# Patient Record
(demographics unavailable — no encounter records)

---

## 2020-01-16 NOTE — PCM.HP.2
H&P History of Present Illness





- General


Date of Service: 01/16/20


Admit Problem/Dx: 


 Admission Diagnosis/Problem





Admission Diagnosis/Problem      Hypoxemia, Respiratory distress, RSV infection

, Moderate persistent asthma with acute exacerbation, Pneumonia, Dehydration, 

Otitis media








Source of Information: Patient, Family


History Limitations: Reports: No Limitations





- History of Present Illness


Initial Comments - Free Text/Narative: 


CC:SOB and wheezing





HPI: Thayer B Matthieu is a 4 yr 5 mo malewith moderate persistent asthmawho 

presents today forcheck up of SOB and wheezing. This has been associated with 

fever, URI symptoms and decreased PO intake. He has urinated only twice in last 

24 hours. He was seen in [facility] and was started on albuterol nebulization and 

prednisolone. He was also positive for RSV. Since he was not improving but 

rather getting worse and still spiking fevers mom got concerned and brought him 

in to get him checked out. He has been exposed to sick contacts. No Flu 

vaccine. He does go to .There is no h/o rash, vomiting, chest or 

abdominal pain, changes in bowel habits, or recent travel h/o. Patient PO 

intake is decreasedwith decreasedurine output.





Clinic Course: Patient was noted to be febrile, tachycardic and hypoxemic. PE 

pertinent for nasal congestion. B/L TM erythematous. Diffuse wheezing with 

retractions and crackles noted. CXR was done and showed peribronchial cuffing 

and perihilar opacities b/l. DDX include: Bronchiolitis, Pneumonia, Asthma 

exacerbation. Duoneb and reassess. On reassessment: Patient still hypoxemic and 

minimal improvement hence patient was admitted to hospital for further 

management.








- Related Data


Allergies/Adverse Reactions: 


 Allergies











Allergy/AdvReac Type Severity Reaction Status Date / Time


 


mushroom Allergy  Rash Verified 01/16/20 18:51











Home Medications: 


 Home Meds





Albuterol [Proventil Neb Soln] 1 ampule INH QID PRN 01/16/20 [History]


Montelukast [Singulair] 4 mg PO DAILY 01/16/20 [History]


Multivitamin Gummy 1 tab PO DAILY 01/16/20 [History]











Past Medical History


Respiratory History: Reports: Asthma, Bronchitis, Recurrent, Pneumonia, 

Recurrent


Genitourinary History: Reports: None


Psychiatric History: Reports: Other (See Below)


Other Psychiatric History: mom states patient might "be on the spectrum" and 

speech delay





- Infectious Disease History


Infectious Disease History: Reports: Other (See Below)


Other Infectious Disease History: current RSV diagnosis





- Past Surgical History


Respiratory Surgical History: Reports: None


Male  Surgical History: Reports: Circumcision





Social & Family History





- Family History


Respiratory: Reports: Asthma (both parents)


Musculoskeletal: Reports: Arthritis (paternal GF)


Endocrine/Metabolic: Reports: Diabetes, type II (Maternal GF)





- Tobacco Use


Smoking Status *Q: Never Smoker


Second Hand Smoke Exposure: No





- Caffeine Use


Caffeine Use: Reports: None





- Living Situation & Occupation


Living situation: Reports: with Family (Lives with Mom. Dad lives seperately. 

Goes to . Also goes to [employer]. Pets: Cat and dog)





H&P Review of Systems





- Review of Systems:


Review Of Systems: See Below


General: Reports: Fever, Weakness, Decreased Appetite


HEENT: Reports: Rhinitis, Post Nasal Drip


Pulmonary: Reports: Shortness of Breath, Wheezing, Cough


Cardiovascular: Reports: No Symptoms


Gastrointestinal: Reports: Decreased Appetite


Genitourinary: Reports: Other (decreased urination)


Musculoskeletal: Reports: No Symptoms


Skin: Reports: Dryness


Psychiatric: Reports: No Symptoms


Neurological: Reports: No Symptoms


Hematologic/Lymphatic: Reports: No Symptoms


Immunologic: Reports: No Symptoms





Exam





- Exam


Exam: See Below





- Vital Signs


Vital Signs: 


 Last Vital Signs











Temp  38.0 C   01/16/20 16:12


 


Pulse  140 H  01/16/20 16:12


 


Resp  50 H  01/16/20 16:12


 


BP  113/54   01/16/20 16:12


 


Pulse Ox  97   01/16/20 17:43











Weight: 17.463 kg





- Exam


Quality Assessment: Supplemental Oxygen


General: Alert, Oriented, Moderate Distress


HEENT: Conjunctiva Clear, EACs Clear, EOMI, Hearing Intact, Rhinitis, Other (B/

L TM erythematous), PERRLA


Neck: Supple, Trachea Midline, 2


Lungs: Decreased Breath Sounds, Crackles, Wheezing, Other (retractions)


Cardiovascular: Regular Rhythm, Tachycardia


GI/Abdominal Exam: Normal Bowel Sounds, Soft, Non-Tender, No Organomegaly, No 

Distention


 (Male) Exam: Normal Inspection


Rectal (Males) Exam: Normal Exam


Back Exam: Normal Inspection, Full Range of Motion, NT


Extremities: Normal Inspection, Normal Range of Motion, Non-Tender, No Pedal 

Edema, Slow Capillary Refill


Skin: Warm, Dry, Intact


Neurological: Reflexes Equal Bilateral


Neuro Extensive - Mental Status: Alert, Oriented x3, Normal Mood/Affect, Normal 

Cognition


Neuro Extensive - Motor, Sensory, Reflexes: Normal Gait, Normal Reflexes


Psychiatric: Alert, Normal Affect, Normal Mood





- Patient Data


Lab Results Last 24 hrs: 


 Laboratory Results - last 24 hr











  01/16/20 01/16/20 Range/Units





  14:30 14:30 


 


WBC  5.88   (5.0-16.0)  K/mm3


 


RBC  4.07   (3.9-5.3)  M/mm3


 


Hgb  11.4 L   (11.5-13.5)  gm/dl


 


Hct  34.0   (34-40)  %


 


MCV  83.5   (75-87)  fl


 


MCH  28.0   (24-30)  pg


 


MCHC  33.5   (31-37)  g/dl


 


RDW Std Deviation  37.7   (35.1-43.9)  fL


 


Plt Count  297   (150-400)  K/mm3


 


MPV  8.5   (7.4-10.4)  fl


 


Neut % (Auto)  67.9 H   (17-53)  %


 


Lymph % (Auto)  20.4 L   (30-60)  %


 


Mono % (Auto)  11.2 H   (2-8)  %


 


Eos % (Auto)  0 L   (1-5)  


 


Baso % (Auto)  0.3   (0-2)  %


 


Neut # (Auto)  3.99   (1.6-8.3)  K/mm3


 


Lymph # (Auto)  1.20 L   (1.3-4.7)  K/mm3


 


Mono # (Auto)  0.66   (0.4-2.0)  K/mm3


 


Eos # (Auto)  0.00   (0-0.3)  K/mm3


 


Baso # (Auto)  0.02   (0.0-0.3)  K/mm3


 


Sodium   133 L  (138-145)  mEq/L


 


Potassium   4.4  (3.4-4.7)  mEq/L


 


Chloride   101  ()  mEq/L


 


Carbon Dioxide   20  (20-28)  mEq/L


 


Anion Gap   16.4 H  (5-15)  


 


BUN   14  (5-17)  mg/dL


 


Creatinine   0.5  (0.3-0.7)  mg/dL


 


Est Cr Clr Drug Dosing   TNP  


 


Estimated GFR (MDRD)   TNP  


 


BUN/Creatinine Ratio   28.0 H  (14-18)  


 


Glucose   97  ()  mg/dL


 


Calcium   9.0  (9.0-11.0)  mg/dL


 


C-Reactive Protein   4.2 H*  (<1.0)  mg/dL











Result Diagrams: 


 01/16/20 14:30





 01/16/20 14:30





Sepsis Event Note





- Focused Exam


Vital Signs: 


 Vital Signs











  Temp Pulse Resp BP Pulse Ox Pulse Ox


 


 01/16/20 17:43       97


 


 01/16/20 16:12  38.0 C  140 H  50 H  113/54  96 


 


 01/16/20 15:03       92 L


 


 01/16/20 14:45       90 L


 


 01/16/20 12:46   141 H  48 H  124/61 H  92 L 











Date Exam was Performed: 01/16/20


Time Exam was Performed: 19:04





- Problem List


(1) Hypoxemia


SNOMED Code(s): 359420769


   ICD Code: R09.02 - HYPOXEMIA   Status: Acute   Current Visit: Yes   





(2) Respiratory distress


SNOMED Code(s): 542576232


   ICD Code: R06.03 - ACUTE RESPIRATORY DISTRESS   Status: Acute   Current Visit

: Yes   





(3) Moderate persistent asthma


SNOMED Code(s): 655597359


   ICD Code: J45.40 - MODERATE PERSISTENT ASTHMA, UNCOMPLICATED   Status: Acute

   Current Visit: Yes   





(4) RSV infection


SNOMED Code(s): 32913912


   ICD Code: B97.4 - RESPIRATORY SYNCYTIAL VIRUS CAUSING DISEASES CLASSD ELSWHR

   Status: Acute   Current Visit: Yes   





(5) Pneumonia


SNOMED Code(s): 245010477


   ICD Code: J18.9 - PNEUMONIA, UNSPECIFIED ORGANISM   Status: Acute   Current 

Visit: Yes   





(6) Otitis media


SNOMED Code(s): 91731574


   ICD Code: H66.90 - OTITIS MEDIA, UNSPECIFIED, UNSPECIFIED EAR   Status: 

Acute   Current Visit: Yes   





(7) Dehydration


SNOMED Code(s): 24413016


   ICD Code: E86.0 - DEHYDRATION   Status: Acute   Current Visit: Yes   


Problem List Initiated/Reviewed/Updated: Yes


Orders Last 24hrs: 


 Active Orders 24 hr











 Category Date Time Status


 


 Admission Status [Patient Status] [ADT] Routine ADT  01/16/20 12:49 Active


 


 Intake and Output Strict [RC] Q4HR Care  01/16/20 12:52 Active


 


 Oxygen Therapy [RC] Q12H Care  01/16/20 12:49 Active


 


 Pulse Oximetry [RC] ASDIRECTED Care  01/16/20 18:11 Active


 


 RT Aerosol Therapy [RC] .PRN Care  01/16/20 12:49 Active


 


 RT Chest Physiotherapy [RC] .PRN Care  01/16/20 12:49 Active


 


 Vital Signs [RC] PER UNIT ROUTINE Care  01/16/20 18:11 Active


 


 Weight [Height and Weight] [RC] DAILY Care  01/16/20 18:11 Active


 


 Regular Diet [DIET] Diet  01/16/20 Lunch Active


 


 CULTURE BLOOD [BC] Stat Lab  01/16/20 14:30 Received


 


 Albuterol [Proventil Neb Soln] Med  01/16/20 15:00 Active





 2.5 mg NEB Q3H   


 


 Azithromycin [Zithromax 200 MG/5 ML Susp] Med  01/16/20 18:15 Pending





 174.63 mg PO Q24H   


 


 Azithromycin [Zithromax 200 MG/5 ML Susp] Med  01/17/20 18:15 Pending





 87.315 mg PO Q24H   


 


 D5 1/2 NS w/ 20 mEq/L KCl 1,000 ml Med  01/16/20 13:00 Active





 IV ASDIRECTED   


 


 cefTRIAXone [Rocephin] 1 gm Med  01/16/20 14:00 Active





 cefTRIAXone [Rocephin] 400 mg   





 Sodium Chloride 0.9% [Normal Saline] 50 ml   





 IV Q24H   


 


 prednisoLONE [OraPred 15 MG/5ML Soln] Med  01/16/20 14:00 Active





 36 mg PO Q24H   


 


 Blood Culture x2 Reflex Set [OM.PC] Stat Oth  01/16/20 13:04 Ordered


 


 Isolation [COMM] Routine Oth  01/16/20 18:10 Ordered


 


 RT Suction Oropharyngeal [RESPCARE] Routine Oth  01/16/20 12:53 Ordered


 


 Resuscitation Status Routine Resus Stat  01/16/20 14:49 Ordered








 Medication Orders





Albuterol (Proventil Neb Soln)  2.5 mg NEB Q3H [facility]


   Last Admin: 01/16/20 17:42  Dose: 2.5 mg


   Admin: 01/16/20 15:03  Dose: 2.5 mg


Azithromycin (Zithromax 200 Mg/5 Ml Susp)  174.63 mg PO Q24H [facility]


Azithromycin (Zithromax 200 Mg/5 Ml Susp)  87.315 mg PO Q24H [facility]


   Stop: 01/20/20 23:59


Potassium Chloride/Dextrose/Sod Cl (D5 1/2 Ns W/ 20 Meq/L Kcl)  1,000 mls @ 60 

mls/hr IV ASDIRECTED [facility]


   Last Admin: 01/16/20 14:14  Dose: 60 mls/hr


Ceftriaxone Sodium 1 gm/Ceftriaxone Sodium 400 mg/Sodium Chloride  50 mls @ 100 

mls/hr IV Q24H [facility]


   Last Admin: 01/16/20 16:05  Dose: 100 mls/hr


Prednisolone (Orapred 15 Mg/5ml Soln)  36 mg PO Q24H [facility]


   Last Admin: 01/16/20 16:07  Dose: 36 mg








Assessment/Plan Comment:: 


4 years old M admitted for management of hypoxemia and respiratory distress 

secondary to Pneumonia (RSV infection) vs Moderate persistent asthma with 

exacerbation, dehydration and otitis media





Plan:


Admit to inpatient


Regular diet as per age and tolerance


Strict I/O


Weight daily


Vital signs as per protocol


Respiratory isolation/precaution 


Oxygen supplementation to keep saturation above 95%


Albuterol nebulization 2.5 mg every 3 hours


NS with bulb suction every 4-6 hours


IVF: D5+1/2NS+20 meq KCL @ 60 ml/hr (1M)


IV Ceftriaxone 75 mg/kg daily


PO Azithromycin 10 mg/kg (day 1) and 5 mg/kg (day 2-5)


PO Prednisolone 2 mg/kg daily


Send CBC, BMP, CRP, Bcx


Chest physiotherapy


PO Singulair 4 mg HS


PO Motrin/tylenol PRN for fever/pain


Plan of care and need for inpatient admission discussed with caregiver. 

Caregiver verbalized understanding and agree with plan. 








- Mortality Measure


Prognosis:: Good

## 2020-01-17 NOTE — PCM.PN
- General Info


Date of Service: 01/17/20


Admission Dx/Problem (Free Text): 


 Admission Diagnosis/Problem





Admission Diagnosis/Problem      Hypoxemia, Respiratory distress, RSV infection

, Moderate persistent asthma with acute exacerbation, Pneumonia, Dehydration, 

Otitis media








Subjective Update: 


4 years old M with possible ASD admitted for management of hypoxemia and 

respiratory distress secondary to Pneumonia (RSV infection) vs Moderate 

persistent asthma with exacerbation, dehydration and otitis media





Today is hospital day 1. Patient was examined at bedside with RN and caregiver 

present. No overnight concerns. Patient PO intake is still poor and still on 1 

M IVF. No more fevers. Overnight oxygen was increased to 4 L to maintain 

saturation above 95%. Since AM it has been weaned down to 1 L. Patient doing 

better with improved air entry. Still on albuterol nebulization Q3h and 

Prednisolone. Also on Ceftriaxone and Azithromycin. Bcx so far negative. Labs 

are stable today and CRP has gone down. Discussed with caregiver. 








Functional Status: Reports: Tolerating Diet, Ambulating, Urinating





- Review of Systems


General: Reports: Appetite (improved)


HEENT: Reports: Sinus Congestion, Rhinitis


Pulmonary: Reports: Shortness of Breath, Cough, Wheezing


Cardiovascular: Reports: No Symptoms


Gastrointestinal: Reports: Decreased Appetite


Genitourinary: Reports: No Symptoms


Musculoskeletal: Reports: No Symptoms


Skin: Reports: No Symptoms


Neurological: Reports: No Symptoms


Psychiatric: Reports: No Symptoms





- Patient Data


Vitals - Most Recent: 


 Last Vital Signs











Temp  36.7 C   01/17/20 16:00


 


Pulse  112 H  01/17/20 16:00


 


Resp  22   01/17/20 16:00


 


BP  103/57   01/17/20 16:00


 


Pulse Ox  92 L  01/17/20 21:16











Weight - Most Recent: 18.008 kg


I&O - Last 24 Hours: 


 Intake & Output











 01/17/20 01/17/20 01/17/20





 06:59 14:59 22:59


 


Intake Total 


 


Output Total 540 350 240


 


Balance 164 460 900











Lab Results Last 24 Hours: 


 Laboratory Results - last 24 hr











  01/17/20 01/17/20 Range/Units





  12:40 12:40 


 


WBC  8.03   (5.0-16.0)  K/mm3


 


RBC  4.45   (3.9-5.3)  M/mm3


 


Hgb  12.4   (11.5-13.5)  gm/dl


 


Hct  37.3   (34-40)  %


 


MCV  83.8   (75-87)  fl


 


MCH  27.9   (24-30)  pg


 


MCHC  33.2   (31-37)  g/dl


 


RDW Std Deviation  37.7   (35.1-43.9)  fL


 


Plt Count  300   (150-400)  K/mm3


 


MPV  8.5   (7.4-10.4)  fl


 


Neutrophils % (Manual)  31   (23-45)  %


 


Band Neutrophils %  0 L   (5-11)  %


 


Lymphocytes % (Manual)  52   (36-65)  %


 


Atypical Lymphs %  0   %


 


Monocytes % (Manual)  16 H   (4-6)  %


 


Eosinophils % (Manual)  0 L   (1-5)  %


 


Basophils % (Manual)  1   (0-2)  


 


Platelet Estimate  Adequate   


 


RBC Morph Comment  Normal   


 


Sodium   139  (138-145)  mEq/L


 


Potassium   4.0  (3.4-4.7)  mEq/L


 


Chloride   106  ()  mEq/L


 


Carbon Dioxide   22  (20-28)  mEq/L


 


Anion Gap   15.0  (5-15)  


 


BUN   7  (5-17)  mg/dL


 


Creatinine   0.4  (0.3-0.7)  mg/dL


 


Est Cr Clr Drug Dosing   TNP  


 


Estimated GFR (MDRD)   TNP  


 


BUN/Creatinine Ratio   17.5  (14-18)  


 


Glucose   117 H  ()  mg/dL


 


Calcium   9.0  (9.0-11.0)  mg/dL


 


C-Reactive Protein   4.0 H*  (<1.0)  mg/dL











[facility] Results Last 24 Hours: 


 Microbiology











 01/16/20 14:30 Aerobic Blood Culture - Preliminary





 Blood - Venous    NO GROWTH AFTER 1 DAY





 Anaerobic Blood Culture - Preliminary





    NO GROWTH AFTER 1 DAY











Med Orders - Current: 


 Current Medications





Acetaminophen (Tylenol)  260 mg PO Q4H PRN


   PRN Reason: Pain/Fever


Albuterol (Proventil Neb Soln)  2.5 mg NEB Q3H [facility]


   Last Admin: 01/17/20 21:12 Dose:  2.5 mg


Azithromycin (Zithromax 200 Mg/5 Ml Susp)  87.315 mg PO Q24H [facility]


   Last Admin: 01/17/20 18:33 Dose:  2.18 ml


Potassium Chloride/Dextrose/Sod Cl (D5 1/2 Ns W/ 20 Meq/L Kcl)  1,000 mls @ 60 

mls/hr IV ASDIRECTED [facility]


   Last Admin: 01/17/20 08:43 Dose:  60 mls/hr


Ceftriaxone Sodium 1 gm/Ceftriaxone Sodium 400 mg/Sodium Chloride  50 mls @ 100 

mls/hr IV Q24H [facility]


   Last Admin: 01/17/20 15:55 Dose:  100 mls/hr


Prednisolone (Orapred 15 Mg/5ml Soln)  36 mg PO Q24H [facility]


   Last Admin: 01/17/20 15:54 Dose:  36 mg





Discontinued Medications





Azithromycin (Zithromax 200 Mg/5 Ml Susp)  174.63 mg PO ONETIME ONE


   Stop: 01/16/20 18:16


   Last Admin: 01/16/20 21:49 Dose:  174.63 mg


Azithromycin (Zithromax 200 Mg/5 Ml Susp)  87.315 mg PO Q24H [facility]


   Stop: 01/20/20 23:59


Azithromycin (Zithromax 200 Mg/5 Ml Susp)  87.315 mg PO Q24H [facility]


   Last Admin: 01/16/20 23:20 Dose:  Not Given


Azithromycin (Zithromax 200 Mg/5 Ml Susp)  87.315 mg PO Q24H [facility]


   Last Admin: 01/16/20 23:20 Dose:  Not Given











- Exam


Quality Assessment: Supplemental Oxygen


General: Alert, Oriented, Mild Distress


HEENT: Pupils Equal, Pupils Reactive, EOMI, Mucous Membr. Moist/Pink


Neck: Supple


Lungs: Decreased Breath Sounds, Crackles, Wheezing


Cardiovascular: Regular Rhythm, Tachycardia


GI/Abdominal Exam: Normal Bowel Sounds, Soft, Non-Tender, No Organomegaly


 (Male) Exam: Normal Inspection


Back Exam: Normal Inspection, Full Range of Motion


Extremities: Normal Inspection, Normal Range of Motion, Normal Capillary Refill


Skin: Warm, Dry, Intact


Neurological: No New Focal Deficit


Psy/Mental Status: Alert, Normal Affect, Normal Mood





Sepsis Event Note





- Focused Exam


Vital Signs: 


 Vital Signs











  Temp Pulse Resp BP Pulse Ox Pulse Ox


 


 01/17/20 21:16       92 L


 


 01/17/20 17:50       96


 


 01/17/20 16:00  36.7 C  112 H  22  103/57  96 


 


 01/17/20 15:56       98


 


 01/17/20 12:19       96


 


 01/17/20 12:00  36.3 C  108  24  107/53  97  99











Date Exam was Performed: 01/17/20


Time Exam was Performed: 21:30





- Problem List & Annotations


(1) Hypoxemia


SNOMED Code(s): 201776725


   Code(s): R09.02 - HYPOXEMIA   Status: Acute   Current Visit: Yes   





(2) Respiratory distress


SNOMED Code(s): 007146162


   Code(s): R06.03 - ACUTE RESPIRATORY DISTRESS   Status: Acute   Current Visit

: Yes   





(3) Moderate persistent asthma


SNOMED Code(s): 273120514


   Code(s): J45.40 - MODERATE PERSISTENT ASTHMA, UNCOMPLICATED   Status: Acute 

  Current Visit: Yes   





(4) RSV infection


SNOMED Code(s): 34572491


   Code(s): B97.4 - RESPIRATORY SYNCYTIAL VIRUS CAUSING DISEASES CLASSD ELSWHR 

  Status: Acute   Current Visit: Yes   





(5) Pneumonia


SNOMED Code(s): 810301384


   Code(s): J18.9 - PNEUMONIA, UNSPECIFIED ORGANISM   Status: Acute   Current 

Visit: Yes   





(6) Otitis media


SNOMED Code(s): 96740750


   Code(s): H66.90 - OTITIS MEDIA, UNSPECIFIED, UNSPECIFIED EAR   Status: Acute

   Current Visit: Yes   





(7) Dehydration


SNOMED Code(s): 35359756


   Code(s): E86.0 - DEHYDRATION   Status: Acute   Current Visit: Yes   





- Problem List Review


Problem List Initiated/Reviewed/Updated: Yes





- My Orders


Last 24 Hours: 


My Active Orders





01/16/20 23:17


Acetaminophen [Tylenol]   260 mg PO Q4H PRN 





01/17/20 18:00


Azithromycin [Zithromax 200 MG/5 ML Susp]   87.315 mg PO Q24H 














- Plan


Plan:: 


4 years old M admitted for management of hypoxemia and respiratory distress 

secondary to Pneumonia (RSV infection) vs Moderate persistent asthma with 

exacerbation, dehydration and otitis media





Plan:


Continue inpatient admission


Regular diet as per age and tolerance


Strict I/O


Weight daily


Vital signs as per protocol


Respiratory isolation/precaution 


Oxygen supplementation to keep saturation above 95%. Try to wean off oxygen


Albuterol nebulization 2.5 mg every 3 hours


NS with bulb suction every 4-6 hours


IVF: D5+1/2NS+20 meq KCL @ 60 ml/hr (1M). Decrease to 1/2 M as PO intake 

improves


IV Ceftriaxone 75 mg/kg daily


PO Azithromycin 5 mg/kg (day 2-5)


PO Prednisolone 2 mg/kg daily


Chest physiotherapy


PO Motrin/tylenol PRN for fever/pain


F/U Bcx


Plan of care and need for continued inpatient admission discussed with 

caregiver. Caregiver verbalized understanding and agree with plan.

## 2020-01-18 NOTE — [id number]
**Discharge Summary





- Hospital Course


Free Text/Narrative:: 


4 years old M with possible ASD admitted for management of hypoxemia and 

respiratory distress secondary to Pneumonia (RSV infection) vs Moderate 

persistent asthma with exacerbation, dehydration and otitis media





Today is hospital day 2. Patient was examined at bedside with RN and caregiver 

present. No overnight concerns. Patient PO intake has improved. IVF were 

decreased to 1/2M and then discontinued. No more fevers. Air entry has improved 

and no retractions. Still has some wheezes and crackles. Today albuterol was 

spaced out to every 4 hours and patient was able to be successfully weaned off 

oxygen and maintaining saturation above 95% on RA. Labs done yesterday were 

stable, CRP was trending down and dehydration resolved. To be discharged on 

Augmentin BID for 7 days, Azithromycin daily for 2 more days and Prednisolone 

daily for 4 more days. Continue Albuterol every 4 hours PRN SOB, wheezing. Keep 

hydrated. Discussed with caregiver. 





Diagnosis: Stroke: No





- Discharge Data


Discharge Date: 01/18/20


Discharge Disposition: Home, Self-Care 01


Condition: Good





- Referral to Home Health


Primary Care Physician: 


Tony Wiley








- Discharge Diagnosis/Problem(s)


(1) Hypoxemia


SNOMED Code(s): 040972637


   ICD Code: R09.02 - HYPOXEMIA   Status: Acute   Current Visit: Yes   





(2) Respiratory distress


SNOMED Code(s): 084837424


   ICD Code: R06.03 - ACUTE RESPIRATORY DISTRESS   Status: Acute   Current Visit

: Yes   





(3) Moderate persistent asthma


SNOMED Code(s): 475282054


   ICD Code: J45.40 - MODERATE PERSISTENT ASTHMA, UNCOMPLICATED   Status: Acute

   Current Visit: Yes   





(4) RSV infection


SNOMED Code(s): 16723059


   ICD Code: B97.4 - RESPIRATORY SYNCYTIAL VIRUS CAUSING DISEASES CLASSD ELSWHR

   Status: Acute   Current Visit: Yes   





(5) Pneumonia


SNOMED Code(s): 656158509


   ICD Code: J18.9 - PNEUMONIA, UNSPECIFIED ORGANISM   Status: Acute   Current 

Visit: Yes   





(6) Otitis media


SNOMED Code(s): 61330222


   ICD Code: H66.90 - OTITIS MEDIA, UNSPECIFIED, UNSPECIFIED EAR   Status: 

Acute   Current Visit: Yes   





(7) Dehydration


SNOMED Code(s): 15202281


   ICD Code: E86.0 - DEHYDRATION   Status: Acute   Current Visit: Yes   





- Patient Instructions


Diet: Usual Diet as Tolerated


Activity: As Tolerated


Other/Special Instructions: Keep hydrated.  Albuterol nebulization 2.5 mg every 

4 hours.  NS with bulb suction every 4-6 hours.  PO Augmentin BID for 7 days.  

PO Azithromycin daily for 2 days.  PO Prednisolone daily for 4 days.  Chest 

physiotherapy.  PO Motrin/tylenol PRN for fever/pain.  F/U with PCP in 2-3 days





- Discharge Plan


*PRESCRIPTION DRUG MONITORING PROGRAM REVIEWED*: Not Applicable


*COPY OF PRESCRIPTION DRUG MONITORING REPORT IN PATIENT NEEL: Not Applicable


Home Medications: 


 Home Meds





Albuterol [Proventil Neb Soln] 1 ampule INH QID PRN 01/16/20 [History]


Montelukast [Singulair] 4 mg PO DAILY 01/16/20 [History]


Multivitamin Gummy 1 tab PO DAILY 01/16/20 [History]








Oxygen Therapy Mode: Room Air


Referrals: 


Tony Wiley [Primary Care Provider] - 01/21/20





- Discharge Summary/Plan Comment


DC Time >30 min.: Yes


Discharge Summary/Plan Comment: 


4 years old M admitted for management of hypoxemia and respiratory distress 

secondary to Pneumonia (RSV infection) vs Moderate persistent asthma with 

exacerbation, dehydration and otitis media





Plan:


Discharge patient home today


Regular diet as per age and tolerance


Keep hydrated


Albuterol nebulization 2.5 mg every 4 hours


NS with bulb suction every 4-6 hours


PO Augmentin BID for 7 days


PO Azithromycin daily for 2 days


PO Prednisolone daily for 4 days


Chest physiotherapy


PO Motrin/tylenol PRN for fever/pain


F/U with PCP in 2-3 days


Plan of care and discharge patient home today discussed with caregiver. 

Caregiver verbalized understanding and agree with plan. 








- General Info


Date of Service: 01/18/20


Admission Dx/Problem (Free Text: 


 Admission Diagnosis/Problem





Admission Diagnosis/Problem      Hypoxemia, Respiratory distress, RSV infection

, Moderate persistent asthma with acute exacerbation, Pneumonia, Dehydration, 

Otitis media








Functional Status: Reports: Tolerating Diet, Ambulating, Urinating





- Review of Systems


General: Reports: No Symptoms


HEENT: Reports: No Symptoms


Pulmonary: Reports: Wheezing


Cardiovascular: Reports: No Symptoms


Gastrointestinal: Reports: No Symptoms


Genitourinary: Reports: No Symptoms


Musculoskeletal: Reports: No Symptoms


Skin: Reports: No Symptoms


Neurological: Reports: No Symptoms


Psychiatric: Reports: No Symptoms





- Patient Data


Vitals - Most Recent: 


 Last Vital Signs











Temp  36.2 C   01/18/20 08:00


 


Pulse  100   01/18/20 08:00


 


Resp  38 H  01/18/20 08:00


 


BP  107/56   01/18/20 08:00


 


Pulse Ox  97   01/18/20 10:00











Weight - Most Recent: 18.008 kg


I&O - Last 24 hours: 


 Intake & Output











 01/17/20 01/18/20 01/18/20





 22:59 06:59 14:59


 


Intake Total 1380 1088 240


 


Output Total 240 425 


 


Balance 1140 663 240











JERARDO Results - Last 24 hrs: 


 Microbiology











 01/16/20 14:30 Aerobic Blood Culture - Preliminary





 Blood - Venous    NO GROWTH AFTER 1 DAY





 Anaerobic Blood Culture - Preliminary





    NO GROWTH AFTER 1 DAY











Med Orders - Current: 


 Current Medications





Acetaminophen (Tylenol)  260 mg PO Q4H PRN


   PRN Reason: Pain/Fever


Albuterol (Proventil Neb Soln)  2.5 mg NEB Q4HRRT [facility]


   Last Admin: 01/18/20 14:08 Dose:  2.5 mg


Azithromycin (Zithromax 200 Mg/5 Ml Susp)  87.315 mg PO Q24H [facility]


   Last Admin: 01/17/20 18:33 Dose:  2.18 ml


Ceftriaxone Sodium 1 gm/Ceftriaxone Sodium 400 mg/Sodium Chloride  50 mls @ 100 

mls/hr IV Q24H [facility]


   Last Admin: 01/17/20 15:55 Dose:  100 mls/hr


Potassium Chloride/Dextrose/Sod Cl (D5 1/2 Ns W/ 20 Meq/L Kcl)  1,000 mls @ 30 

mls/hr IV ASDIRECTED [facility]


Prednisolone (Orapred 15 Mg/5ml Soln)  36 mg PO Q24H [facility]


   Last Admin: 01/17/20 15:54 Dose:  36 mg





Discontinued Medications





Albuterol (Proventil Neb Soln)  2.5 mg NEB Q3H [facility]


   Last Admin: 01/18/20 08:27 Dose:  2.5 mg


Azithromycin (Zithromax 200 Mg/5 Ml Susp)  174.63 mg PO ONETIME ONE


   Stop: 01/16/20 18:16


   Last Admin: 01/16/20 21:49 Dose:  174.63 mg


Azithromycin (Zithromax 200 Mg/5 Ml Susp)  87.315 mg PO Q24H [facility]


   Stop: 01/20/20 23:59


Azithromycin (Zithromax 200 Mg/5 Ml Susp)  87.315 mg PO Q24H [facility]


   Last Admin: 01/16/20 23:20 Dose:  Not Given


Azithromycin (Zithromax 200 Mg/5 Ml Susp)  87.315 mg PO Q24H [facility]


   Last Admin: 01/16/20 23:20 Dose:  Not Given


Potassium Chloride/Dextrose/Sod Cl (D5 1/2 Ns W/ 20 Meq/L Kcl)  1,000 mls @ 60 

mls/hr IV ASDIRECTED [facility]


   Last Admin: 01/18/20 02:24 Dose:  60 mls/hr











- Exam


General: Reports: Alert, Oriented


HEENT: Reports: Pupils Equal, Pupils Reactive, EOMI, Mucous Membr. Moist/Pink


Neck: Reports: Supple


Lungs: Reports: Clear to Auscultation, Crackles, Wheezing


Cardiovascular: Reports: Regular Rate, Regular Rhythm


GI/Abdominal Exam: Normal Bowel Sounds, Soft, Non-Tender, No Organomegaly


 (Male) Exam: Normal Inspection


Rectal (Males) Exam: Normal Exam


Back Exam: Reports: Normal Inspection, Full Range of Motion


Extremities: Normal Inspection, Normal Range of Motion, Normal Capillary Refill


Skin: Reports: Warm, Dry, Intact


Neurological: Reports: No New Focal Deficit


Psy/Mental Status: Reports: Alert, Normal Affect, Normal Mood